# Patient Record
Sex: FEMALE | Race: BLACK OR AFRICAN AMERICAN | Employment: STUDENT | URBAN - METROPOLITAN AREA
[De-identification: names, ages, dates, MRNs, and addresses within clinical notes are randomized per-mention and may not be internally consistent; named-entity substitution may affect disease eponyms.]

---

## 2019-10-22 ENCOUNTER — HOSPITAL ENCOUNTER (OUTPATIENT)
Age: 24
Setting detail: SPECIMEN
Discharge: HOME | End: 2019-10-22
Payer: COMMERCIAL

## 2019-10-22 ENCOUNTER — OFFICE VISIT (OUTPATIENT)
Dept: FAMILY MEDICINE CLINIC | Age: 24
End: 2019-10-22
Payer: COMMERCIAL

## 2019-10-22 VITALS
TEMPERATURE: 96.1 F | HEIGHT: 66 IN | DIASTOLIC BLOOD PRESSURE: 83 MMHG | BODY MASS INDEX: 42.75 KG/M2 | HEART RATE: 76 BPM | SYSTOLIC BLOOD PRESSURE: 119 MMHG | WEIGHT: 266 LBS

## 2019-10-22 DIAGNOSIS — L98.9 SKIN LESION: ICD-10-CM

## 2019-10-22 DIAGNOSIS — E66.01 CLASS 3 SEVERE OBESITY DUE TO EXCESS CALORIES WITHOUT SERIOUS COMORBIDITY WITH BODY MASS INDEX (BMI) OF 40.0 TO 44.9 IN ADULT (HCC): ICD-10-CM

## 2019-10-22 DIAGNOSIS — Z76.89 ENCOUNTER TO ESTABLISH CARE: ICD-10-CM

## 2019-10-22 DIAGNOSIS — Z11.4 ENCOUNTER FOR SCREENING FOR HIV: ICD-10-CM

## 2019-10-22 DIAGNOSIS — E78.5 HYPERLIPIDEMIA, UNSPECIFIED HYPERLIPIDEMIA TYPE: ICD-10-CM

## 2019-10-22 DIAGNOSIS — E78.5 HYPERLIPIDEMIA, UNSPECIFIED HYPERLIPIDEMIA TYPE: Primary | ICD-10-CM

## 2019-10-22 LAB
ABSOLUTE EOS #: 0.09 K/UL (ref 0–0.44)
ABSOLUTE IMMATURE GRANULOCYTE: <0.03 K/UL (ref 0–0.3)
ABSOLUTE LYMPH #: 2.3 K/UL (ref 1.1–3.7)
ABSOLUTE MONO #: 0.39 K/UL (ref 0.1–1.2)
ANION GAP SERPL CALCULATED.3IONS-SCNC: 11 MMOL/L (ref 9–17)
BASOPHILS # BLD: 1 % (ref 0–2)
BASOPHILS ABSOLUTE: 0.03 K/UL (ref 0–0.2)
BUN BLDV-MCNC: 10 MG/DL (ref 6–20)
BUN/CREAT BLD: NORMAL (ref 9–20)
CALCIUM SERPL-MCNC: 9.4 MG/DL (ref 8.6–10.4)
CHLORIDE BLD-SCNC: 102 MMOL/L (ref 98–107)
CHOLESTEROL/HDL RATIO: 3.5
CHOLESTEROL: 156 MG/DL
CO2: 26 MMOL/L (ref 20–31)
CREAT SERPL-MCNC: 0.69 MG/DL (ref 0.5–0.9)
DIFFERENTIAL TYPE: NORMAL
EOSINOPHILS RELATIVE PERCENT: 2 % (ref 1–4)
GFR AFRICAN AMERICAN: >60 ML/MIN
GFR NON-AFRICAN AMERICAN: >60 ML/MIN
GFR SERPL CREATININE-BSD FRML MDRD: NORMAL ML/MIN/{1.73_M2}
GFR SERPL CREATININE-BSD FRML MDRD: NORMAL ML/MIN/{1.73_M2}
GLUCOSE BLD-MCNC: 94 MG/DL (ref 70–99)
HCT VFR BLD CALC: 41.3 % (ref 36.3–47.1)
HDLC SERPL-MCNC: 45 MG/DL
HEMOGLOBIN: 13.6 G/DL (ref 11.9–15.1)
HIV AG/AB: NONREACTIVE
IMMATURE GRANULOCYTES: 0 %
LDL CHOLESTEROL: 101 MG/DL (ref 0–130)
LYMPHOCYTES # BLD: 37 % (ref 24–43)
MCH RBC QN AUTO: 27.8 PG (ref 25.2–33.5)
MCHC RBC AUTO-ENTMCNC: 32.9 G/DL (ref 28.4–34.8)
MCV RBC AUTO: 84.5 FL (ref 82.6–102.9)
MONOCYTES # BLD: 6 % (ref 3–12)
NRBC AUTOMATED: 0 PER 100 WBC
PDW BLD-RTO: 13.2 % (ref 11.8–14.4)
PLATELET # BLD: 299 K/UL (ref 138–453)
PLATELET ESTIMATE: NORMAL
PMV BLD AUTO: 9.8 FL (ref 8.1–13.5)
POTASSIUM SERPL-SCNC: 4.1 MMOL/L (ref 3.7–5.3)
RBC # BLD: 4.89 M/UL (ref 3.95–5.11)
RBC # BLD: NORMAL 10*6/UL
SEG NEUTROPHILS: 54 % (ref 36–65)
SEGMENTED NEUTROPHILS ABSOLUTE COUNT: 3.33 K/UL (ref 1.5–8.1)
SODIUM BLD-SCNC: 139 MMOL/L (ref 135–144)
TRIGL SERPL-MCNC: 52 MG/DL
VLDLC SERPL CALC-MCNC: NORMAL MG/DL (ref 1–30)
WBC # BLD: 6.2 K/UL (ref 3.5–11.3)
WBC # BLD: NORMAL 10*3/UL

## 2019-10-22 PROCEDURE — G8419 CALC BMI OUT NRM PARAM NOF/U: HCPCS | Performed by: STUDENT IN AN ORGANIZED HEALTH CARE EDUCATION/TRAINING PROGRAM

## 2019-10-22 PROCEDURE — 99203 OFFICE O/P NEW LOW 30 MIN: CPT | Performed by: STUDENT IN AN ORGANIZED HEALTH CARE EDUCATION/TRAINING PROGRAM

## 2019-10-22 PROCEDURE — 1036F TOBACCO NON-USER: CPT | Performed by: STUDENT IN AN ORGANIZED HEALTH CARE EDUCATION/TRAINING PROGRAM

## 2019-10-22 PROCEDURE — G8484 FLU IMMUNIZE NO ADMIN: HCPCS | Performed by: STUDENT IN AN ORGANIZED HEALTH CARE EDUCATION/TRAINING PROGRAM

## 2019-10-22 PROCEDURE — G8427 DOCREV CUR MEDS BY ELIG CLIN: HCPCS | Performed by: STUDENT IN AN ORGANIZED HEALTH CARE EDUCATION/TRAINING PROGRAM

## 2019-10-22 ASSESSMENT — ENCOUNTER SYMPTOMS
SHORTNESS OF BREATH: 0
NAUSEA: 0
SORE THROAT: 0
RHINORRHEA: 0
VOMITING: 0
PHOTOPHOBIA: 0
WHEEZING: 0
COUGH: 0
EYE PAIN: 0
ABDOMINAL PAIN: 0

## 2019-10-22 ASSESSMENT — PATIENT HEALTH QUESTIONNAIRE - PHQ9
1. LITTLE INTEREST OR PLEASURE IN DOING THINGS: 0
SUM OF ALL RESPONSES TO PHQ QUESTIONS 1-9: 0
2. FEELING DOWN, DEPRESSED OR HOPELESS: 0
SUM OF ALL RESPONSES TO PHQ QUESTIONS 1-9: 0
SUM OF ALL RESPONSES TO PHQ9 QUESTIONS 1 & 2: 0

## 2019-11-05 ENCOUNTER — OFFICE VISIT (OUTPATIENT)
Dept: DERMATOLOGY | Age: 24
End: 2019-11-05
Payer: COMMERCIAL

## 2019-11-05 ENCOUNTER — HOSPITAL ENCOUNTER (OUTPATIENT)
Age: 24
Setting detail: SPECIMEN
Discharge: HOME | End: 2019-11-05
Payer: COMMERCIAL

## 2019-11-05 VITALS
WEIGHT: 269 LBS | DIASTOLIC BLOOD PRESSURE: 85 MMHG | HEIGHT: 66 IN | SYSTOLIC BLOOD PRESSURE: 132 MMHG | OXYGEN SATURATION: 98 % | BODY MASS INDEX: 43.23 KG/M2 | HEART RATE: 95 BPM

## 2019-11-05 DIAGNOSIS — D22.5 IRRITATED NEVUS OF BACK: Primary | ICD-10-CM

## 2019-11-05 PROCEDURE — 11301 SHAVE SKIN LESION 0.6-1.0 CM: CPT | Performed by: DERMATOLOGY

## 2019-11-05 RX ORDER — LIDOCAINE HYDROCHLORIDE AND EPINEPHRINE 10; 10 MG/ML; UG/ML
0.5 INJECTION, SOLUTION INFILTRATION; PERINEURAL ONCE
Status: COMPLETED | OUTPATIENT
Start: 2019-11-05 | End: 2019-11-05

## 2019-11-05 RX ADMIN — LIDOCAINE HYDROCHLORIDE AND EPINEPHRINE 0.5 ML: 10; 10 INJECTION, SOLUTION INFILTRATION; PERINEURAL at 10:03

## 2019-11-06 ENCOUNTER — TELEPHONE (OUTPATIENT)
Dept: FAMILY MEDICINE CLINIC | Age: 24
End: 2019-11-06

## 2019-11-07 LAB — DERMATOLOGY PATHOLOGY REPORT: NORMAL

## 2020-02-18 ENCOUNTER — OFFICE VISIT (OUTPATIENT)
Dept: FAMILY MEDICINE CLINIC | Age: 25
End: 2020-02-18
Payer: COMMERCIAL

## 2020-02-18 VITALS
BODY MASS INDEX: 40.98 KG/M2 | DIASTOLIC BLOOD PRESSURE: 70 MMHG | HEIGHT: 66 IN | WEIGHT: 255 LBS | SYSTOLIC BLOOD PRESSURE: 92 MMHG | TEMPERATURE: 97.9 F

## 2020-02-18 PROCEDURE — G8427 DOCREV CUR MEDS BY ELIG CLIN: HCPCS | Performed by: STUDENT IN AN ORGANIZED HEALTH CARE EDUCATION/TRAINING PROGRAM

## 2020-02-18 PROCEDURE — 99211 OFF/OP EST MAY X REQ PHY/QHP: CPT | Performed by: FAMILY MEDICINE

## 2020-02-18 PROCEDURE — G8484 FLU IMMUNIZE NO ADMIN: HCPCS | Performed by: STUDENT IN AN ORGANIZED HEALTH CARE EDUCATION/TRAINING PROGRAM

## 2020-02-18 PROCEDURE — 99213 OFFICE O/P EST LOW 20 MIN: CPT | Performed by: STUDENT IN AN ORGANIZED HEALTH CARE EDUCATION/TRAINING PROGRAM

## 2020-02-18 PROCEDURE — G8417 CALC BMI ABV UP PARAM F/U: HCPCS | Performed by: STUDENT IN AN ORGANIZED HEALTH CARE EDUCATION/TRAINING PROGRAM

## 2020-02-18 PROCEDURE — 1036F TOBACCO NON-USER: CPT | Performed by: STUDENT IN AN ORGANIZED HEALTH CARE EDUCATION/TRAINING PROGRAM

## 2020-02-18 ASSESSMENT — ENCOUNTER SYMPTOMS
SHORTNESS OF BREATH: 0
WHEEZING: 0
COUGH: 0
SORE THROAT: 0
NAUSEA: 0
PHOTOPHOBIA: 0
RHINORRHEA: 0
EYE PAIN: 0
VOMITING: 0
ABDOMINAL PAIN: 0

## 2020-02-18 NOTE — PROGRESS NOTES
Subjective:    Max Villatoro is a 30 y.o. female with  has no past medical history on file. No family history on file. Presented tothe office today for:  Chief Complaint   Patient presents with    Leg Pain    Leg Swelling    Numbness     arms and legs       HPI   Patient is a 30year old female here with complaints of bilateral leg swelling and numbness/tingling of the bilateral upper extremities. Numbness and tingling  Bilateral upper extremities  Tingling in the fingers radiating up the arm to the elbows with associated weakness  Ongoing for the past 1 month    Lower extremity edema   Associated with burning sensation below the knee when swelling is present. Noticed most when she is on her feet for long periods of time    No recent fever, chills, no travel history  No medications  IUD is only medication on list  Takes nothing OTC. No supplements   No urinary symptoms    Review of Systems   Constitutional: Negative for chills and fever. HENT: Negative for congestion, rhinorrhea and sore throat. Eyes: Negative for photophobia and pain. Respiratory: Negative for cough, shortness of breath and wheezing. Cardiovascular: Negative for chest pain and palpitations. Gastrointestinal: Negative for abdominal pain, nausea and vomiting. Genitourinary: Negative for frequency and urgency. Musculoskeletal: Negative for arthralgias and myalgias. Neurological: Negative for dizziness, light-headedness and headaches. Objective:    BP 92/70 (Site: Right Upper Arm, Position: Sitting, Cuff Size: Large Adult) Comment: manually  Temp 97.9 °F (36.6 °C) (Temporal)   Ht 5' 6\" (1.676 m)   Wt 255 lb (115.7 kg)   BMI 41.16 kg/m²    BP Readings from Last 3 Encounters:   02/18/20 92/70   11/05/19 132/85   10/22/19 119/83       Physical Exam  Constitutional:       General: She is not in acute distress. Appearance: She is well-developed. HENT:      Head: Normocephalic and atraumatic.    Eyes: Family history of thyroid disease  - TSH With Reflex Ft4; Future    Requested Prescriptions     Signed Prescriptions Disp Refills    Elastic Bandages & Supports (MEDICAL COMPRESSION STOCKINGS) MISC 1 each 0     Si each by Does not apply route daily as needed (swelling)    Elastic Bandages & Supports (CARPAL TUNNEL WRIST DELUXE) MISC 1 each 0     Si each by Does not apply route once for 1 dose       There are no discontinued medications. Yoli received counseling on the following healthy behaviors:nutrition, exercise and medication adherence    Discussed use, benefit, and side effects of prescribed medications. Barriers to medication compliance addressed. All patient questions answered. Pt voicedunderstanding. Return in about 2 weeks (around 3/3/2020).

## 2020-02-18 NOTE — PATIENT INSTRUCTIONS
Thank you for letting us take care of you today. We hope all your questions were addressed. If a question was overlooked or something else comes to mind after you return home, please contact a member of your Care Team listed below. Please make sure you have a routine office visit set up to follow-up on [address]. Your Care Team at [place] [address] is Team #2  America Poe DO (Faculty)  Johnny Murillo MD (Faculty)  Sheryl Cook MD (Resident)  Christiano Pitts MD (Resident)  Sukhjinder Feliciano MD (Resident)  Glen Perez MD (Resident)  ANGLE Lee. ,JOE HERRERA, [facility] office)  [facility] office)  Chet Bella ([facility])  Lakeisha Sterling [place] ([address] [facility])  Myla Saini, [address] [place] [address] (Clinical Pharmacist)     Office phone number: [phone]    If you need to get in right away due to illness, please be advised we have \"Same Day\" appointments available Monday-Friday. Please call us at [phone] option #3 to schedule your \"Same Day\" appointment.

## 2020-02-19 NOTE — PROGRESS NOTES
Attending Physician Statement    Wt Readings from Last 3 Encounters:   02/18/20 255 lb (115.7 kg)   11/05/19 269 lb (122 kg)   10/22/19 266 lb (120.7 kg)     Temp Readings from Last 3 Encounters:   02/18/20 97.9 °F (36.6 °C) (Temporal)   10/22/19 96.1 °F (35.6 °C) (Temporal)     BP Readings from Last 3 Encounters:   02/18/20 92/70   11/05/19 132/85   10/22/19 119/83     Pulse Readings from Last 3 Encounters:   11/05/19 95   10/22/19 76         I have discussed the care of Jose Shivers, including pertinent history and exam findings with the resident. I have reviewed the key elements of all parts of the encounter with the resident. I agree with the assessment, plan and orders as documented by the resident.   (GE Modifier)

## 2020-09-01 ENCOUNTER — HOSPITAL ENCOUNTER (OUTPATIENT)
Age: 25
Setting detail: SPECIMEN
Discharge: HOME | End: 2020-09-01
Payer: COMMERCIAL

## 2020-09-01 ENCOUNTER — OFFICE VISIT (OUTPATIENT)
Dept: FAMILY MEDICINE CLINIC | Age: 25
End: 2020-09-01
Payer: COMMERCIAL

## 2020-09-01 VITALS
OXYGEN SATURATION: 97 % | RESPIRATION RATE: 18 BRPM | DIASTOLIC BLOOD PRESSURE: 89 MMHG | HEIGHT: 66 IN | HEART RATE: 77 BPM | WEIGHT: 275 LBS | TEMPERATURE: 97 F | BODY MASS INDEX: 44.2 KG/M2 | SYSTOLIC BLOOD PRESSURE: 134 MMHG

## 2020-09-01 PROBLEM — E66.01 CLASS 3 SEVERE OBESITY DUE TO EXCESS CALORIES WITHOUT SERIOUS COMORBIDITY WITH BODY MASS INDEX (BMI) OF 40.0 TO 44.9 IN ADULT (HCC): Status: ACTIVE | Noted: 2020-09-01

## 2020-09-01 LAB
HBV SURFACE AB TITR SER: <3.5 MIU/ML
RUBV IGG SER QL: 78.9 IU/ML

## 2020-09-01 PROCEDURE — G8427 DOCREV CUR MEDS BY ELIG CLIN: HCPCS

## 2020-09-01 PROCEDURE — 1036F TOBACCO NON-USER: CPT

## 2020-09-01 PROCEDURE — 90715 TDAP VACCINE 7 YRS/> IM: CPT | Performed by: HOSPITALIST

## 2020-09-01 PROCEDURE — 99213 OFFICE O/P EST LOW 20 MIN: CPT

## 2020-09-01 PROCEDURE — G8417 CALC BMI ABV UP PARAM F/U: HCPCS

## 2020-09-01 PROCEDURE — 99211 OFF/OP EST MAY X REQ PHY/QHP: CPT | Performed by: HOSPITALIST

## 2020-09-01 ASSESSMENT — ENCOUNTER SYMPTOMS
NAUSEA: 0
APNEA: 0
COUGH: 0
ABDOMINAL PAIN: 0
WHEEZING: 0
COLOR CHANGE: 0
SHORTNESS OF BREATH: 0
VOMITING: 0
CONSTIPATION: 0
DIARRHEA: 0
PHOTOPHOBIA: 0

## 2020-09-01 ASSESSMENT — PATIENT HEALTH QUESTIONNAIRE - PHQ9
2. FEELING DOWN, DEPRESSED OR HOPELESS: 0
SUM OF ALL RESPONSES TO PHQ QUESTIONS 1-9: 0
1. LITTLE INTEREST OR PLEASURE IN DOING THINGS: 0
SUM OF ALL RESPONSES TO PHQ9 QUESTIONS 1 & 2: 0
SUM OF ALL RESPONSES TO PHQ QUESTIONS 1-9: 0

## 2020-09-01 NOTE — PROGRESS NOTES
Visit Information    Have you changed or started any medications since your last visit including any over-the-counter medicines, vitamins, or herbal medicines? no   Have you stopped taking any of your medications? Is so, why? -  no  Are you having any side effects from any of your medications? - no    Have you seen any other physician or provider since your last visit? yes - obgyn   Have you had any other diagnostic tests since your last visit?  no   Have you been seen in the emergency room and/or had an admission in a hospital since we last saw you?  no   Have you had your routine dental cleaning in the past 6 months?  no     Do you have an active MyChart account? If no, what is the barrier?   No: pending    Patient Care Team:  Cuong Solis MD as PCP - General (Family Medicine)  Cuong Solis MD as PCP - [facility] Provider    Medical History Review  Past Medical, Family, and Social History reviewed and does not contribute to the patient presenting condition    Health Maintenance   Topic Date Due    Varicella vaccine (1 of 2 - 2-dose childhood series) 04/13/1994    DTaP/Tdap/Td vaccine (1 - Tdap) 04/13/2012    Cervical cancer screen  04/13/2014    Flu vaccine (1) 09/01/2020    HIV screen  Completed    Hepatitis A vaccine  Aged Out    Hepatitis B vaccine  Aged Out    Hib vaccine  Aged Out    Meningococcal (ACWY) vaccine  Aged Out    Pneumococcal 0-64 years Vaccine  Aged Out

## 2020-09-01 NOTE — PROGRESS NOTES
Attending Physician Statement  I have discussed the care of Kera Wakefield, 30 y.o. female,including pertinent history and exam findings,  with the resident Dr. Christel Rojas MD.  History:  Chief Complaint   Patient presents with    Annual Exam     needs physical form completed for school, wants to have titers drawn   [address]     gets pap at [place]     Patient is here for school physical and health maintenance. I have reviewed the key elements of the encounter with the resident. Examination was done by resident as documented in residents note. BP Readings from Last 3 Encounters:   09/01/20 134/89   02/18/20 92/70   11/05/19 132/85     /89 (Site: Left Lower Arm, Position: Sitting, Cuff Size: Medium Adult)   Pulse 77   Temp 97 °F (36.1 °C)   Resp 18   Ht 5' 6\" (1.676 m)   Wt 275 lb (124.7 kg)   LMP 08/31/2020 (Exact Date)   SpO2 97%   BMI 44.39 kg/m²   Lab Results   Component Value Date    WBC 6.2 10/22/2019    HGB 13.6 10/22/2019    HCT 41.3 10/22/2019     10/22/2019    CHOL 156 10/22/2019    TRIG 52 10/22/2019    HDL 45 10/22/2019     10/22/2019    K 4.1 10/22/2019     10/22/2019    CREATININE 0.69 10/22/2019    BUN 10 10/22/2019    CO2 26 10/22/2019     Lab Results   Component Value Date    CALCIUM 9.4 10/22/2019     Lab Results   Component Value Date    LDLCHOLESTEROL 101 10/22/2019     I agree with the assessment, plan and diagnosis of    Diagnosis Orders   1. PPD screening test  tuberculin injection 5 Units   2. Class 3 severe obesity due to excess calories without serious comorbidity with body mass index (BMI) of 40.0 to 44.9 in adult ([facility] [address]     3. High serum varicella zoster virus (VZV) antibody titer  Varicella Zoster Antibody, IgG   4. Elevated rubella titer     5. Measles, mumps, rubella (MMR) vaccination status unknown  Rubeola Antibody IgG    Mumps Antibody, IgG    Rubella Antibody, IgG   6.  Need for Tdap vaccination  Tdap (age 6y and older) IM (Boostrix)   7. Immunization due  [place] IMMUNIZ ADMIN,1 SINGLE/COMB VAC/TOXOID   8. Hepatitis B immune  Hepatitis B Surface Antibody     I agree with  orders as documented by the resident. Recommendations:   Physical exam is normal, patient was counseled, MMR and Hep B titers ordered and forms completed. Return in about 3 days (around 9/4/2020) for Nursing visit for PPD reading.    (Lianne Chavez ) Dr. Yue Espino MD

## 2020-09-01 NOTE — PROGRESS NOTES
Subjective:    Kera Wakefield is a 30 y.o. female with  has no past medical history on file. Presented to the office today for:  Chief Complaint   Patient presents with    Annual Exam     needs physical form completed for school, wants to have titers drawn   [address]     gets pap at [facility]       HPI  41-year-old female came to the clinic because of school physical.  Patient does not have any complaints or concerns. Patient has history of preeclampsia and some headaches with her menstrual cycles but she takes Excedrin and states that it stable. Patient is comfortable at the moment and also requires various immunization titers which I will give it to her and also PPD. I will have the patient come back in 72 hours for a PPD reading as a nursing visit. Patient denies any vision changes, nausea, vomiting, chest pain, shortness of breath, numbness or tingling in the body and no other questions or concerns at this time . Review of Systems   Constitutional: Negative for activity change, appetite change, fatigue and fever. HENT: Negative for congestion. Eyes: Negative for photophobia and visual disturbance. Respiratory: Negative for apnea, cough, shortness of breath and wheezing. Cardiovascular: Negative for chest pain, palpitations and leg swelling. Gastrointestinal: Negative for abdominal pain, constipation, diarrhea, nausea and vomiting. Endocrine: Negative for polyphagia and polyuria. Genitourinary: Negative for dysuria and urgency. Skin: Negative for color change, pallor and rash. Neurological: Positive for headaches. Negative for dizziness, tremors and weakness. Psychiatric/Behavioral: Negative for agitation, behavioral problems, confusion, decreased concentration and dysphoric mood. The patient has a No family history on file.     Objective:    /89 (Site: Left Lower Arm, Position: Sitting, Cuff Size: Medium Adult)   Pulse 77   Temp 97 °F (36.1 °C)   Resp 18   Ht 5' 6\" (1.676 m)   Wt 275 lb (124.7 kg)   LMP 08/31/2020 (Exact Date)   SpO2 97%   BMI 44.39 kg/m²    BP Readings from Last 3 Encounters:   09/01/20 134/89   02/18/20 92/70   11/05/19 132/85       Physical Exam  Constitutional:       General: She is not in acute distress. Appearance: She is not ill-appearing, toxic-appearing or diaphoretic. HENT:      Nose: Nose normal. No congestion. Eyes:      General: No scleral icterus. Right eye: No discharge. Left eye: No discharge. Extraocular Movements: Extraocular movements intact. Conjunctiva/sclera: Conjunctivae normal.      Pupils: Pupils are equal, round, and reactive to light. Cardiovascular:      Rate and Rhythm: Normal rate and regular rhythm. Pulses: Normal pulses. Heart sounds: No murmur. Pulmonary:      Effort: Pulmonary effort is normal.      Breath sounds: Normal breath sounds. No wheezing. Abdominal:      Tenderness: There is no abdominal tenderness. There is no guarding. Musculoskeletal: Normal range of motion. General: No swelling or tenderness. Right lower leg: No edema. Left lower leg: No edema. Skin:     General: Skin is warm. Coloration: Skin is not jaundiced. Findings: No erythema. Neurological:      Mental Status: She is alert and oriented to person, place, and time. Motor: No weakness. Psychiatric:         Mood and Affect: Mood normal.         Behavior: Behavior normal.         Thought Content:  Thought content normal.         Judgment: Judgment normal.         Lab Results   Component Value Date    WBC 6.2 10/22/2019    HGB 13.6 10/22/2019    HCT 41.3 10/22/2019     10/22/2019    CHOL 156 10/22/2019    TRIG 52 10/22/2019    HDL 45 10/22/2019     10/22/2019    K 4.1 10/22/2019     10/22/2019    CREATININE 0.69 10/22/2019    BUN 10 10/22/2019    CO2 26 10/22/2019     Lab Results   Component Value Date    CALCIUM 9.4 10/22/2019     Lab Results   Component Value Date    LDLCHOLESTEROL 101 10/22/2019       Assessment and Plan:    1. PPD screening test  - tuberculin injection 5 Units    2. Class 3 severe obesity due to excess calories without serious comorbidity with body mass index (BMI) of 40.0 to 44.9 in adult (HCC)  Weight loss regimen and exercise     3. High serum varicella zoster virus (VZV) antibody titer  - Varicella Zoster Antibody, IgG; Future    4. Elevated rubella titer    5. Measles, mumps, rubella (MMR) vaccination status unknown  - Rubeola Antibody IgG; Future  - Mumps Antibody, IgG; Future  - Rubella Antibody, IgG; Future    6. Need for Tdap vaccination  - Tdap (age 6y and older) IM (Boostrix)    7. Immunization due  - [place] IMMUNIZ ADMIN,1 SINGLE/COMB VAC/TOXOID    8. Hepatitis B immune  - Hepatitis B Surface Antibody; Future          Requested Prescriptions      No prescriptions requested or ordered in this encounter       There are no discontinued medications. Yoli received counseling on the following healthy behaviors: nutrition, exercise and medication adherence    Discussed use,benefit, and side effects of prescribed medications. Barriers to medication compliance addressed. All patient questions answered. Pt voiced understanding. Return in about 3 days (around 9/4/2020) for Nursing visit for PPD reading. Disclaimer: Some orall of this note was transcribed using voice-recognition software. This may cause typographical errors occasionally. Although all effort is made to fix these errors, please do not hesitate to contact our office if there Sigurd Gosselin concern with the understanding of this note.

## 2020-09-02 LAB
MEASLES IMMUNE (IGG): 3.54
MUV IGG SER QL: 3.5
VZV IGG SER QL IA: 2.5

## 2020-09-03 ENCOUNTER — NURSE ONLY (OUTPATIENT)
Dept: FAMILY MEDICINE CLINIC | Age: 25
End: 2020-09-03
Payer: COMMERCIAL

## 2020-09-03 LAB
INDURATION: 0
TB SKIN TEST: NEGATIVE

## 2020-09-03 PROCEDURE — 86580 TB INTRADERMAL TEST: CPT

## 2020-09-03 NOTE — PROGRESS NOTES
Patient here today for nurse visit for PPD reading. Patient was injected on  9-1-2020  in  Right  forearm with the following results.      PPD Reading Note    Results 0.0 mm induration  Interpretation  Negative   Allergic reaction  None    If
20-May-2018 20:38

## 2020-09-08 ENCOUNTER — NURSE ONLY (OUTPATIENT)
Dept: FAMILY MEDICINE CLINIC | Age: 25
End: 2020-09-08
Payer: COMMERCIAL

## 2020-09-08 VITALS — TEMPERATURE: 94.6 F

## 2020-09-08 PROCEDURE — 86580 TB INTRADERMAL TEST: CPT | Performed by: FAMILY MEDICINE

## 2020-09-10 ENCOUNTER — NURSE ONLY (OUTPATIENT)
Dept: FAMILY MEDICINE CLINIC | Age: 25
End: 2020-09-10
Payer: COMMERCIAL

## 2020-09-10 LAB
INDURATION: NORMAL
TB SKIN TEST: NEGATIVE

## 2020-09-10 PROCEDURE — 99999 PR OFFICE/OUTPT VISIT,PROCEDURE ONLY: CPT | Performed by: STUDENT IN AN ORGANIZED HEALTH CARE EDUCATION/TRAINING PROGRAM

## 2020-09-10 NOTE — PROGRESS NOTES
Patient here today for nurse visit for PPD reading. Patient was injected on  9-8-20 in  leftforearm with the following results.      PPD Reading Note    Results  0.0 mm induration  Interpretation  negative  Allergic reaction  None    I

## 2020-10-23 ENCOUNTER — OFFICE VISIT (OUTPATIENT)
Dept: FAMILY MEDICINE CLINIC | Age: 25
End: 2020-10-23
Payer: COMMERCIAL

## 2020-10-23 ENCOUNTER — NURSE TRIAGE (OUTPATIENT)
Dept: OTHER | Facility: CLINIC | Age: 25
End: 2020-10-23

## 2020-10-23 VITALS
TEMPERATURE: 97.1 F | DIASTOLIC BLOOD PRESSURE: 84 MMHG | HEART RATE: 88 BPM | BODY MASS INDEX: 44.77 KG/M2 | WEIGHT: 278.6 LBS | HEIGHT: 66 IN | SYSTOLIC BLOOD PRESSURE: 126 MMHG

## 2020-10-23 PROCEDURE — 99213 OFFICE O/P EST LOW 20 MIN: CPT | Performed by: STUDENT IN AN ORGANIZED HEALTH CARE EDUCATION/TRAINING PROGRAM

## 2020-10-23 PROCEDURE — G8484 FLU IMMUNIZE NO ADMIN: HCPCS | Performed by: STUDENT IN AN ORGANIZED HEALTH CARE EDUCATION/TRAINING PROGRAM

## 2020-10-23 PROCEDURE — 99211 OFF/OP EST MAY X REQ PHY/QHP: CPT | Performed by: STUDENT IN AN ORGANIZED HEALTH CARE EDUCATION/TRAINING PROGRAM

## 2020-10-23 PROCEDURE — 1036F TOBACCO NON-USER: CPT | Performed by: STUDENT IN AN ORGANIZED HEALTH CARE EDUCATION/TRAINING PROGRAM

## 2020-10-23 PROCEDURE — G8417 CALC BMI ABV UP PARAM F/U: HCPCS | Performed by: STUDENT IN AN ORGANIZED HEALTH CARE EDUCATION/TRAINING PROGRAM

## 2020-10-23 PROCEDURE — G8427 DOCREV CUR MEDS BY ELIG CLIN: HCPCS | Performed by: STUDENT IN AN ORGANIZED HEALTH CARE EDUCATION/TRAINING PROGRAM

## 2020-10-23 RX ORDER — IBUPROFEN 600 MG/1
600 TABLET ORAL 3 TIMES DAILY PRN
Qty: 90 TABLET | Refills: 0 | Status: SHIPPED | OUTPATIENT
Start: 2020-10-23 | End: 2021-11-12 | Stop reason: ALTCHOICE

## 2020-10-23 NOTE — PROGRESS NOTES
I have reviewed and discussed key elements of [address] with the resident including plan of care and follow up and agree with the care errol plan.

## 2020-10-23 NOTE — PROGRESS NOTES
Visit Information    Have you changed or started any medications since your last visit including any over-the-counter medicines, vitamins, or herbal medicines? no   Have you stopped taking any of your medications? Is so, why? -  no  Are you having any side effects from any of your medications? - no    Have you seen any other physician or provider since your last visit?  no   Have you had any other diagnostic tests since your last visit?  no   Have you been seen in the emergency room and/or had an admission in a hospital since we last saw you?  no   Have you had your routine dental cleaning in the past 6 months?  no     Do you have an active MyChart account? If no, what is the barrier?   Yes    Patient Care Team:  Eugenia Dillon MD as PCP - General (Family Medicine)  Eugenia Dillon MD as PCP - [facility]    Medical History Review  Past Medical, Family, and Social History reviewed and does not contribute to the patient presenting condition    Health Maintenance   Topic Date Due    Varicella vaccine (1 of 2 - 2-dose childhood series) 04/13/1994    Cervical cancer screen  04/13/2014    Flu vaccine (1) 09/01/2020    DTaP/Tdap/Td vaccine (3 - Td) 09/01/2030    Hepatitis A vaccine  Completed    Meningococcal (ACWY) vaccine  Completed    HIV screen  Completed    Hepatitis B vaccine  Aged Out    Hib vaccine  Aged Out    Pneumococcal 0-64 years Vaccine  Aged Out

## 2020-10-23 NOTE — PROGRESS NOTES
[address] [facility] RESIDENCY PROGRAM    Subjective:    Corbin Hough is a 30 y.o. female with  has no past medical history on file. No family history on file. 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 HENT: Negative for congestion, rhinorrhea, sneezing, sore throat, trouble swallowing and voice change. Eyes: Negative for pain and visual disturbance. Respiratory: Negative for cough. Negative for shortness of breath, wheezing and stridor. Cardiovascular: Negative for chest pain, palpitations and leg swelling. Gastrointestinal: Negative for abdominal pain, constipation, diarrhea, nausea and vomiting. Genitourinary: Negative for difficulty urinating and dysuria. Musculoskeletal: Positive Left foot pain. Negative for gait problem, neck pain and neck stiffness. Neurological: Negative for dizziness, seizures, syncope, weakness and headaches. Objective:    /84 (Site: Right Lower Arm, Position: Sitting, Cuff Size: Medium Adult) Comment: machine  Pulse 88   Temp 97.1 °F (36.2 °C) (Temporal)   Ht 5' 5.98\" (1.676 m)   Wt 278 lb 9.6 oz (126.4 kg)   LMP 09/26/2020 (Exact Date)   BMI 44.99 kg/m²    BP Readings from Last 3 Encounters:   10/23/20 126/84   09/01/20 134/89   02/18/20 92/70     Physical Exam  Constitutional:       General: He is not in acute distress.      Appearance: Normal appearance. HENT:      Head: Normocephalic and atraumatic. Cardiovascular:      Rate and Rhythm: Normal rate and regular rhythm. No murmur   Pulmonary:      Effort: Pulmonary effort is normal. No respiratory distress. Breath sounds: Normal breath sounds. No wheezing. Abdominal:      General: Abdomen is flat. Bowel sounds are normal. There is no distension. Palpations: Abdomen is soft. Tenderness: There is no tenderness. Musculoskeletal: Normal range of motion. Right lower leg: No edema. Left lower leg: No edema. ROM wnl pain to palpation on the lateral posterior aspect inferior to lateral malleolus. No swelling or erythema. Pulses, sensation and strength WNL and symmetrical. Able to bear weight. Gait is normal   Skin:     General: Skin is warm and dry. Neurological:      General: No focal deficit present. Mental Status: He is alert and oriented to person, place, and time. Psychiatric:         Mood and Affect: Mood normal.      Lab Results   Component Value Date    WBC 6.2 10/22/2019    HGB 13.6 10/22/2019    HCT 41.3 10/22/2019     10/22/2019    CHOL 156 10/22/2019    TRIG 52 10/22/2019    HDL 45 10/22/2019     10/22/2019    K 4.1 10/22/2019     10/22/2019    CREATININE 0.69 10/22/2019    BUN 10 10/22/2019    CO2 26 10/22/2019     Lab Results   Component Value Date    CALCIUM 9.4 10/22/2019     Lab Results   Component Value Date    LDLCHOLESTEROL 101 10/22/2019       Assessment:     1. Left foot pain    2. Class 3 severe obesity due to excess calories without serious comorbidity with body mass index (BMI) of 40.0 to 44.9 in [facility])        Plan:   1. Left foot pain  - Acute believed to be from overuse; lateral tendonopathy. No indication for XR at this time   - Advised RICE and given exercise for ankle  - ibuprofen (ADVIL;MOTRIN) 600 MG tablet; Take 1 tablet by mouth 3 times daily as needed for Pain  Dispense: 90 tablet; Refill: 0    2.  Class 3 severe obesity due to excess calories without serious comorbidity with body mass index (BMI) of 40.0 to 44.9 in adult [facility])  - Discussed weight loss options   - Given information       Lilytkalareceived counseling on the following healthy behaviors: nutrition, exercise and medication adherence    Patient given educational materials : see patient instruction     Discussed use, benefit, and side effects of prescribed medications. Barriers to medicationcompliance addressed. All patient questions answered. Pt voiced understanding. There are no discontinued medications. No follow-ups on file.

## 2020-10-28 ENCOUNTER — TELEPHONE (OUTPATIENT)
Dept: FAMILY MEDICINE CLINIC | Age: 25
End: 2020-10-28

## 2020-10-28 NOTE — TELEPHONE ENCOUNTER
Writer called the patient no answer, no voicemail setup. Writer called about where did she want to send her script for her ibuprofen 600 mg too. Patient do not have preferred pharmacy.

## 2020-12-30 ENCOUNTER — APPOINTMENT (OUTPATIENT)
Dept: GENERAL RADIOLOGY | Age: 25
End: 2020-12-30
Payer: COMMERCIAL

## 2020-12-30 ENCOUNTER — HOSPITAL ENCOUNTER (EMERGENCY)
Age: 25
Discharge: HOME | End: 2020-12-30
Payer: COMMERCIAL

## 2020-12-30 VITALS
WEIGHT: 282.7 LBS | BODY MASS INDEX: 45.43 KG/M2 | OXYGEN SATURATION: 99 % | HEART RATE: 85 BPM | DIASTOLIC BLOOD PRESSURE: 76 MMHG | SYSTOLIC BLOOD PRESSURE: 129 MMHG | HEIGHT: 66 IN | TEMPERATURE: 98.3 F | RESPIRATION RATE: 18 BRPM

## 2020-12-30 PROCEDURE — 6360000002 HC RX W HCPCS: Performed by: PHYSICIAN ASSISTANT

## 2020-12-30 PROCEDURE — 99283 EMERGENCY DEPT VISIT LOW MDM: CPT

## 2020-12-30 PROCEDURE — 99282 EMERGENCY DEPT VISIT SF MDM: CPT

## 2020-12-30 PROCEDURE — 96372 THER/PROPH/DIAG INJ SC/IM: CPT

## 2020-12-30 PROCEDURE — 73562 X-RAY EXAM OF KNEE 3: CPT

## 2020-12-30 RX ORDER — NAPROXEN 500 MG/1
500 TABLET ORAL 2 TIMES DAILY WITH MEALS
Qty: 20 TABLET | Refills: 0 | Status: SHIPPED | OUTPATIENT
Start: 2020-12-30 | End: 2021-11-12 | Stop reason: ALTCHOICE

## 2020-12-30 RX ORDER — ACETAMINOPHEN AND CODEINE PHOSPHATE 300; 30 MG/1; MG/1
1-2 TABLET ORAL 3 TIMES DAILY PRN
Qty: 12 TABLET | Refills: 0 | Status: SHIPPED | OUTPATIENT
Start: 2020-12-30 | End: 2021-01-02

## 2020-12-30 RX ORDER — KETOROLAC TROMETHAMINE 30 MG/ML
60 INJECTION, SOLUTION INTRAMUSCULAR; INTRAVENOUS ONCE
Status: COMPLETED | OUTPATIENT
Start: 2020-12-30 | End: 2020-12-30

## 2020-12-30 RX ADMIN — KETOROLAC TROMETHAMINE 60 MG: 30 INJECTION, SOLUTION INTRAMUSCULAR at 21:18

## 2020-12-30 ASSESSMENT — PAIN SCALES - GENERAL
PAINLEVEL_OUTOF10: 10
PAINLEVEL_OUTOF10: 8
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
## 2021-01-07 ENCOUNTER — OFFICE VISIT (OUTPATIENT)
Dept: FAMILY MEDICINE CLINIC | Age: 26
End: 2021-01-07
Payer: COMMERCIAL

## 2021-01-07 VITALS
HEART RATE: 74 BPM | SYSTOLIC BLOOD PRESSURE: 133 MMHG | BODY MASS INDEX: 41.4 KG/M2 | WEIGHT: 257.6 LBS | HEIGHT: 66 IN | DIASTOLIC BLOOD PRESSURE: 92 MMHG | TEMPERATURE: 96.7 F | RESPIRATION RATE: 20 BRPM

## 2021-01-07 DIAGNOSIS — Z23 NEED FOR INFLUENZA VACCINATION: ICD-10-CM

## 2021-01-07 DIAGNOSIS — R03.0 ELEVATED BP WITHOUT DIAGNOSIS OF HYPERTENSION: ICD-10-CM

## 2021-01-07 DIAGNOSIS — S83.92XA SPRAIN OF LEFT KNEE, UNSPECIFIED LIGAMENT, INITIAL ENCOUNTER: Primary | ICD-10-CM

## 2021-01-07 PROCEDURE — 99213 OFFICE O/P EST LOW 20 MIN: CPT | Performed by: STUDENT IN AN ORGANIZED HEALTH CARE EDUCATION/TRAINING PROGRAM

## 2021-01-07 PROCEDURE — 90686 IIV4 VACC NO PRSV 0.5 ML IM: CPT | Performed by: STUDENT IN AN ORGANIZED HEALTH CARE EDUCATION/TRAINING PROGRAM

## 2021-01-07 SDOH — ECONOMIC STABILITY: FOOD INSECURITY: WITHIN THE PAST 12 MONTHS, THE FOOD YOU BOUGHT JUST DIDN'T LAST AND YOU DIDN'T HAVE MONEY TO GET MORE.: NEVER TRUE

## 2021-01-07 SDOH — ECONOMIC STABILITY: TRANSPORTATION INSECURITY
IN THE PAST 12 MONTHS, HAS LACK OF TRANSPORTATION KEPT YOU FROM MEETINGS, WORK, OR FROM GETTING THINGS NEEDED FOR DAILY LIVING?: NO

## 2021-01-07 SDOH — ECONOMIC STABILITY: TRANSPORTATION INSECURITY
IN THE PAST 12 MONTHS, HAS THE LACK OF TRANSPORTATION KEPT YOU FROM MEDICAL APPOINTMENTS OR FROM GETTING MEDICATIONS?: NO

## 2021-01-07 ASSESSMENT — PATIENT HEALTH QUESTIONNAIRE - PHQ9
SUM OF ALL RESPONSES TO PHQ9 QUESTIONS 1 & 2: 0
SUM OF ALL RESPONSES TO PHQ QUESTIONS 1-9: 0

## 2021-01-07 NOTE — PROGRESS NOTES
[address] [facility] FAMILY MEDICINE RESIDENCY PROGRAM    Subjective:    Maci Sherman is a 30 y.o. female with  has no past medical history on file. No family history on file. Presented to the office today for:  Chief Complaint   Patient presents with    Other     follow up left knee pain        HPI    Left knee pain  Se[facility] on 12/30 after slip and fall 5 days prior to that   XR of Left Knee at that time as noted as negative   Today pain is better, rom is better   Swelling is down  Still some pain on inside with pivoting motion   Able to navigate stairs/ steps  Is on her feet a lot for work       Review of Systems   Constitutional: Negative for activity change, appetite change, chills, diaphoresis, fatigue, fever and unexpected weight change. HENT: Negative for congestion, rhinorrhea, sneezing, sore throat, trouble swallowing and voice change. Eyes: Negative for pain and visual disturbance. Respiratory: Negative for cough. Negative for shortness of breath, wheezing and stridor. Cardiovascular: Negative for chest pain, palpitations and leg swelling. Gastrointestinal: Negative for abdominal pain, constipation, diarrhea, nausea and vomiting. Genitourinary: Negative for difficulty urinating and dysuria. Musculoskeletal: Positive for arthralgias. Negative for gait problem, neck pain and neck stiffness. Neurological: Negative for dizziness, seizures, syncope, weakness and headaches. Objective:    BP (!) 133/98 (Site: Left Lower Arm, Position: Sitting, Cuff Size: Medium Adult)   Pulse 74   Temp 96.7 °F (35.9 °C) (Temporal)   Resp 20   Ht 5' 6\" (1.676 m)   Wt 257 lb 9.6 oz (116.8 kg)   LMP 12/19/2020   BMI 41.58 kg/m²    BP Readings from Last 3 Encounters:   01/07/21 (!) 133/98   12/30/20 129/76   10/23/20 126/84     Physical Exam  Constitutional:       General: He is not in acute distress. Appearance: Normal appearance.    HENT: Head: Normocephalic and atraumatic. Cardiovascular:      Rate and Rhythm: Normal rate and regular rhythm. No murmur   Pulmonary:      Effort: Pulmonary effort is normal. No respiratory distress. Breath sounds: Normal breath sounds. No wheezing. Abdominal:      General: Abdomen is flat. Bowel sounds are normal. There is no distension. Palpations: Abdomen is soft. Tenderness: There is no tenderness. Musculoskeletal:      Left knee, vargus (-), valgus (-)  Anterior and posterior draw negative     Some pain or ROM no swelling or tenderness to palpation        Right lower leg: No edema. Left lower leg: No edema. Skin:     General: Skin is warm and dry. Neurological:      General: No focal deficit present. Mental Status: He is alert and oriented to person, place, and time. Psychiatric:         Mood and Affect: Mood normal.      Lab Results   Component Value Date    WBC 6.2 10/22/2019    HGB 13.6 10/22/2019    HCT 41.3 10/22/2019     10/22/2019    CHOL 156 10/22/2019    TRIG 52 10/22/2019    HDL 45 10/22/2019     10/22/2019    K 4.1 10/22/2019     10/22/2019    CREATININE 0.69 10/22/2019    BUN 10 10/22/2019    CO2 26 10/22/2019     Lab Results   Component Value Date    CALCIUM 9.4 10/22/2019     Lab Results   Component Value Date    LDLCHOLESTEROL 101 10/22/2019       Assessment:     1. Sprain of left knee, unspecified ligament, initial encounter    2. Need for influenza vaccination    3. Elevated BP without diagnosis of hypertension        Plan:   1. Sprain of left knee, unspecified ligament, initial encounter  - Pain and swelling that has improved since fall  - XR negative   - Advised to continue with conservative measures including RICE, NSAIDs and home exercises. - Will do home PT/ exercises   - If no improvement by next visit may consider MRI     2.  Need for influenza vaccination - INFLUENZA, QUADV, 0.5ML, 6 MO AND OLDER, IM, PF, PREFILL SYR OR SDV (FLUZONE QUADV, PF)    3. Elevated BP without diagnosis of hypertension  - elevation in office could be due to pain  - will follow at next visit     Yolireceived counseling on the following healthy behaviors: nutrition, exercise and medication adherence    Patient given educational materials : see patient instruction     Discussed use, benefit, and side effects of prescribed medications. Barriers to medicationcompliance addressed. All patient questions answered. Pt voiced understanding. Medications Discontinued During This Encounter   Medication Reason    Elastic Bandages & Supports (CARPAL TUNNEL WRIST DELUXE) MISC ERROR       Return in about 4 weeks (around 2/4/2021) for F/U left knee sprain.

## 2021-04-16 ENCOUNTER — VIRTUAL VISIT (OUTPATIENT)
Dept: FAMILY MEDICINE CLINIC | Age: 26
End: 2021-04-16
Payer: COMMERCIAL

## 2021-04-16 ENCOUNTER — TELEPHONE (OUTPATIENT)
Dept: ADMINISTRATIVE | Age: 26
End: 2021-04-16

## 2021-04-16 ENCOUNTER — NURSE TRIAGE (OUTPATIENT)
Dept: OTHER | Facility: CLINIC | Age: 26
End: 2021-04-16

## 2021-04-16 DIAGNOSIS — R51.9 NONINTRACTABLE EPISODIC HEADACHE, UNSPECIFIED HEADACHE TYPE: Primary | ICD-10-CM

## 2021-04-16 DIAGNOSIS — I10 ESSENTIAL HYPERTENSION: ICD-10-CM

## 2021-04-16 PROCEDURE — 99442 PR PHYS/QHP TELEPHONE EVALUATION 11-20 MIN: CPT | Performed by: STUDENT IN AN ORGANIZED HEALTH CARE EDUCATION/TRAINING PROGRAM

## 2021-04-16 RX ORDER — ACETAMINOPHEN 500 MG
1000 TABLET ORAL 3 TIMES DAILY PRN
Qty: 180 TABLET | Refills: 0 | Status: SHIPPED | OUTPATIENT
Start: 2021-04-16 | End: 2021-11-12 | Stop reason: ALTCHOICE

## 2021-04-16 RX ORDER — HYDROCHLOROTHIAZIDE 25 MG/1
25 TABLET ORAL EVERY MORNING
Qty: 90 TABLET | Refills: 1 | Status: SHIPPED | OUTPATIENT
Start: 2021-04-16 | End: 2021-08-02 | Stop reason: SDUPTHER

## 2021-04-16 NOTE — PROGRESS NOTES
I have reviewed and discussed key elements of [address] with the resident including plan of care and follow up and agree with the care errol plan.

## 2021-04-16 NOTE — PATIENT INSTRUCTIONS
Thank you for letting us take care of you today. We hope all your questions were addressed. If a question was overlooked or something else comes to mind after you return home, please contact a member of your Care Team listed below. Your Care Team at [place] [address] is Team #4  Micheal Noble MD (Faculty)  Cruz Lee MD (Resident)  Camilla Williamson MD (Resident)  Stacy Olson MD (Resident)  Artis Zapata MD (Resident)  JOE Vazquez [facility] office)  Colonel Sadler, [address] (Clinical Practice Manager)  Kamran Austin [facility] (Clinical Pharmacist)       Office phone number: [phone]    If you need to get in right away due to illness, please be advised we have \"Same Day\" appointments available Monday-Friday. Please call us at [phone] option #3 to schedule your \"Same Day\" appointment.

## 2021-04-16 NOTE — PROGRESS NOTES
Giselle Verduzco is a 27 y.o. female evaluated via telephone on 4/16/2021. Consent:  She and/or health care decision maker is aware that that she may receive a bill for this telephone service, depending on her insurance coverage, and has provided verbal consent to proceed: Yes    Documentation:  I communicated with the patient and/or health care decision maker about recurrent cyclical headaches. Details of this discussion including any medical advice provided: Pt with hx of monthly headaches that come on with her menstrual cycle. This month has been significantly worse prompting visit ot ED. Had CT head w/o, no subarachnoid, no other findings. Pt no longer has IUD, was removed, headaches improved after. Denies leg swelling, denies CP/SOB/vision changes. Has been measuring using mom's BP machine, elevated SBP (with pain, makes sense). Starting HCTZ, pt gets leg swelling intermittently gravity dependent so avoiding Norvasc for now. Diagnosis Orders   1. Nonintractable episodic headache, unspecified headache type  acetaminophen (TYLENOL) 500 MG tablet   2. Essential hypertension  hydroCHLOROthiazide (HYDRODIURIL) 25 MG tablet   - Pt taking Excedrin, and Motrin for headaches  - Cyclical w/ monthly menstrual  - Encouraged increased PO hydration  - IUD Mirena has already been removed, no longer contributing (headaches was worse when it was present)    I affirm this is a Patient Initiated Episode with a Patient who has not had a related appointment within my department in the past 7 days or scheduled within the next 24 hours. Patient identification was verified at the start of the visit: Yes    Total Time: minutes: 11-20 minutes    The visit was conducted pursuant to the emergency declaration under the [address] [place] [address], [address] [place] [address] authority and the [employer] General Act.   Patient identification was verified, and a caregiver was

## 2021-04-16 NOTE — TELEPHONE ENCOUNTER
Received call from Miya at pre-service center [facility] with [employer] Complaint. Brief description of triage: Pt states recent COVID diagnosis, but is out of quarantine. Pt states headache x3 weeks that comes and goes daily. Pt is unsure if this is related to COVID, menstrual cycle or HTN. Pt states she had migraines when using IUD, but is not using now. Pt was seen in ED for same symptoms yesterday. Pt has taken ibuprofen without relief. Triage indicates for patient to be seen by PCP today. Care advice provided, patient verbalizes understanding; denies any other questions or concerns; instructed to call back for any new or worsening symptoms. Writer provided warm transfer to Olinda Gomez at [facility] for appointment scheduling. Attention Provider: Thank you for allowing me to participate in the care of your patient. The patient was connected to triage in response to information provided to the ECC. Please do not respond through this encounter as the response is not directed to a shared pool. Reason for Disposition   Patient wants to be seen    Answer Assessment - Initial Assessment Questions  1. LOCATION: \"Where does it hurt? \"       Above R eye, pressure in center of head    2. ONSET: \"When did the headache start? \" (Minutes, hours or days)       Pt states 3 weeks ago    3. PATTERN: \"Does the pain come and go, or has it been constant since it started? \"      Pt states comes and goes    4. SEVERITY: \"How bad is the pain? \" and \"What does it keep you from doing? \"  (e.g., Scale 1-10; mild, moderate, or severe)    - MILD (1-3): doesn't interfere with normal activities     - MODERATE (4-7): interferes with normal activities or awakens from sleep     - SEVERE (8-10): excruciating pain, unable to do any normal activities         Pt states 4/10    5. RECURRENT SYMPTOM: \"Have you ever had headaches before? \" If so, ask: \"When was the last time? \" and \"What happened that time? \"       Pt states yes and it has been a while    6. CAUSE: \"What do you think is causing the headache? \"      Pt is unsure. States it could be caused by her menstrual cycle or recent COVID diagnosis    7. MIGRAINE: \"Have you been diagnosed with migraine headaches? \" If so, ask: \"Is this headache similar? \"       Yes when she was using IUD    8. HEAD INJURY: \"Has there been any recent injury to the head? \"       Denies    9. OTHER SYMPTOMS: \"Do you have any other symptoms? \" (fever, stiff neck, eye pain, sore throat, cold symptoms)      HTN, recent COVID diagnosis    10. PREGNANCY: \"Is there any chance you are pregnant? \" \"When was your last menstrual period? \"        Denies-recent menstrual cycle    Protocols used: HEADACHE-ADULT-OH

## 2021-08-02 DIAGNOSIS — I10 ESSENTIAL HYPERTENSION: ICD-10-CM

## 2021-08-02 RX ORDER — HYDROCHLOROTHIAZIDE 25 MG/1
25 TABLET ORAL EVERY MORNING
Qty: 90 TABLET | Refills: 5 | Status: SHIPPED | OUTPATIENT
Start: 2021-08-02

## 2021-08-02 NOTE — TELEPHONE ENCOUNTER
Last visit: 04/16/21  Last Med refill: 04/16/21  Does patient have enough medication for 72 hours: Yes    Next Visit Date:  No future appointments.     Health Maintenance   Topic Date Due    Hepatitis C screen  Never done    Varicella vaccine (1 of 2 - 2-dose childhood series) Never done    COVID-19 Vaccine (1) Never done    Cervical cancer screen  Never done    Potassium monitoring  10/22/2020    Creatinine monitoring  10/22/2020    Flu vaccine (1) 09/01/2021    DTaP/Tdap/Td vaccine (3 - Td or Tdap) 09/01/2030    Hepatitis A vaccine  Completed    Meningococcal (ACWY) vaccine  Completed    HIV screen  Completed    Hepatitis B vaccine  Aged Out    Hib vaccine  Aged Out    Pneumococcal 0-64 years Vaccine  Aged Out       No results found for: LABA1C          ( goal A1C is < 7)   No results found for: LABMICR  LDL Cholesterol (mg/dL)   Date Value   10/22/2019 101       (goal LDL is <100)   BUN (mg/dL)   Date Value   10/22/2019 10     BP Readings from Last 3 Encounters:   01/07/21 (!) 133/92   12/30/20 129/76   10/23/20 126/84          (goal 120/80)    All Future Testing planned in [facility]  Lab Frequency Next Occurrence   XR KNEE RIGHT (MIN 4 VIEWS) Once 09/05/2021   XR KNEE LEFT (MIN 4 VIEWS) Once 09/05/2021               Patient Active Problem List:     Class 3 severe obesity due to excess calories without serious comorbidity with body mass index (BMI) of 40.0 to 44.9 in adult [facility])

## 2021-11-12 ENCOUNTER — OFFICE VISIT (OUTPATIENT)
Dept: FAMILY MEDICINE CLINIC | Age: 26
End: 2021-11-12
Payer: COMMERCIAL

## 2021-11-12 VITALS
WEIGHT: 266.6 LBS | HEART RATE: 90 BPM | DIASTOLIC BLOOD PRESSURE: 74 MMHG | SYSTOLIC BLOOD PRESSURE: 121 MMHG | BODY MASS INDEX: 43.03 KG/M2

## 2021-11-12 DIAGNOSIS — I10 PRIMARY HYPERTENSION: ICD-10-CM

## 2021-11-12 PROCEDURE — 1036F TOBACCO NON-USER: CPT | Performed by: FAMILY MEDICINE

## 2021-11-12 PROCEDURE — G8484 FLU IMMUNIZE NO ADMIN: HCPCS | Performed by: FAMILY MEDICINE

## 2021-11-12 PROCEDURE — G8427 DOCREV CUR MEDS BY ELIG CLIN: HCPCS | Performed by: FAMILY MEDICINE

## 2021-11-12 PROCEDURE — G8417 CALC BMI ABV UP PARAM F/U: HCPCS | Performed by: FAMILY MEDICINE

## 2021-11-12 PROCEDURE — 99213 OFFICE O/P EST LOW 20 MIN: CPT | Performed by: FAMILY MEDICINE

## 2021-11-12 NOTE — PROGRESS NOTES
2021     Mary Rogers (:  1995) is a 27 y.o. female, here for evaluation of the following medical concerns:  Chief Complaint   Patient presents with    Annual Exam     HPIrequesting routine PE and titers  Had titers done in   No acute complaints  No concerns related to HTN or medications      Review of Systems   Respiratory: Negative for cough and shortness of breath. Cardiovascular: Negative for chest pain and palpitations. Gastrointestinal: Negative for abdominal distention. Neurological: Negative for headaches. Prior to Visit Medications    Medication Sig Taking? Authorizing Provider   hydroCHLOROthiazide (HYDRODIURIL) 25 MG tablet Take 1 tablet by mouth every morning Yes Hawa Evangelista MD   Elastic Bandages & Supports ([facility]) [address] 1 each by Does not apply route daily as needed (swelling)  Patient not taking: Reported on 2021  Jacobo Villa MD      No Known Allergies  Social History     Tobacco Use    Smoking status: Never Smoker    Smokeless tobacco: Never Used   Substance Use Topics    Alcohol use: Not on file        Vitals:    21 1122   BP: 121/74   Site: Left Upper Arm   Position: Sitting   Cuff Size: Large Adult   Pulse: 90   Weight: 266 lb 9.6 oz (120.9 kg)     Estimated body mass index is 43.03 kg/m² as calculated from the following:    Height as of 21: 5' 6\" (1.676 m). Weight as of this encounter: 266 lb 9.6 oz (120.9 kg). Physical Exam  Constitutional:       Appearance: Normal appearance. Cardiovascular:      Heart sounds: Normal heart sounds. Pulmonary:      Effort: Pulmonary effort is normal.      Breath sounds: Normal breath sounds. Neurological:      Mental Status: She is alert. Psychiatric:         Mood and Affect: Mood normal.         Behavior: Behavior normal.         ASSESSMENT/PLAN:     Diagnosis Orders   1. Primary hypertension       Return in about 3 months (around 2022) for BP recheck. continue current rx. Labs reviewed  Requested Prescriptions      No prescriptions requested or ordered in this encounter     An electronic signature was used to authenticate this note.     --Randal Murphy,  on12/2/2021 at 3:59 PM

## 2021-11-12 NOTE — PROGRESS NOTES
Visit Information    Have you changed or started any medications since your last visit including any over-the-counter medicines, vitamins, or herbal medicines? no   Are you having any side effects from any of your medications? -  no  Have you stopped taking any of your medications? Is so, why? -  no    Have you seen any other physician or provider since your last visit? No  Have you had any other diagnostic tests since your last visit? No  Have you been seen in the emergency room and/or had an admission to a hospital since we last saw you? No  Have you had your routine dental cleaning in the past 6 months? no    Have you activated your [employer] account? If not, what are your barriers?  Yes     Patient Care Team:  Ryan Parmar MD as PCP - General (Family Medicine)    Medical History Review  Past Medical, Family, and Social History reviewed and does not contribute to the patient presenting condition    Health Maintenance   Topic Date Due    Hepatitis C screen  Never done    Varicella vaccine (1 of 2 - 2-dose childhood series) Never done    COVID-19 Vaccine (1) Never done    Pap smear  Never done    Potassium monitoring  10/22/2020    Creatinine monitoring  10/22/2020    Flu vaccine (1) 09/01/2021    DTaP/Tdap/Td vaccine (3 - Td or Tdap) 09/01/2030    Hepatitis A vaccine  Completed    Meningococcal (ACWY) vaccine  Completed    HIV screen  Completed    Hepatitis B vaccine  Aged Out    Hib vaccine  Aged Out    Pneumococcal 0-64 years Vaccine  Aged Out

## 2021-11-12 NOTE — PATIENT INSTRUCTIONS
Thank you for letting us take care of you today. We hope all your questions were addressed. If a question was overlooked or something else comes to mind after you return home, please contact a member of your Care Team listed below. Your Care Team at [place] [address] is Team #5  Castillo Awad MD (Faculty)  Velvet Farrell MD (Resident)  Yolette Terry MD (Resident)  Michele Velarde MD (Resident)  Minal Okeefe MD (Resident)  Ivan Plummer., JOE Meade., CHEPE Leigh., Tom Chance., [facility] office)  [place], [address] (Clinical Practice Manager)  Alvin Diaz [facility] (Clinical Pharmacist)       Office phone number: [phone]    If you need to get in right away due to illness, please be advised we have \"Same Day\" appointments available Monday-Friday. Please call us at [phone] option #3 to schedule your \"Same Day\" appointment.

## 2021-12-02 PROBLEM — I10 PRIMARY HYPERTENSION: Status: ACTIVE | Noted: 2021-12-02

## 2021-12-02 ASSESSMENT — ENCOUNTER SYMPTOMS
SHORTNESS OF BREATH: 0
ABDOMINAL DISTENTION: 0
COUGH: 0